# Patient Record
Sex: MALE | Race: WHITE | NOT HISPANIC OR LATINO | URBAN - NONMETROPOLITAN AREA
[De-identification: names, ages, dates, MRNs, and addresses within clinical notes are randomized per-mention and may not be internally consistent; named-entity substitution may affect disease eponyms.]

---

## 2018-12-03 PROBLEM — H26.491: Noted: 2018-12-03

## 2018-12-03 PROBLEM — Z96.1: Noted: 2018-12-03

## 2018-12-03 PROBLEM — H35.3132: Noted: 2018-12-03

## 2018-12-03 PROBLEM — H52.4: Noted: 2018-12-03

## 2018-12-03 PROBLEM — H43.813: Noted: 2018-12-03

## 2019-12-09 ENCOUNTER — IMPORTED ENCOUNTER (OUTPATIENT)
Dept: URBAN - NONMETROPOLITAN AREA CLINIC 1 | Facility: CLINIC | Age: 79
End: 2019-12-09

## 2019-12-09 PROCEDURE — 92014 COMPRE OPH EXAM EST PT 1/>: CPT

## 2019-12-09 PROCEDURE — 92015 DETERMINE REFRACTIVE STATE: CPT

## 2019-12-09 NOTE — PATIENT DISCUSSION
Presbyopia OUDiscussed refractive status in detail with patient. New glasses Rx given today. Continue to monitor. Pseudophakia OU mild PCO OD S/P YAG OSDiscussed diagnosis in detail with patient. Both intraocular lenses in place and stable. Mild PCO noted OD but not visually significant. Continue to monitor. 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 Discussed the chronic nature of this disease and limited treatment options. Recommended no smoking. OCT done today; stable with no subretinal fluid OU. Continue to monitor. RTC in 1 year with OCT PVD OUDiscussed findings of exam in detail with the patient. The risk of retinal detachment in patients with PVDs was discussed with the patient and the warning signs of retinal detachment were carefully reviewed with the patient. The patient was warned to return to the office or contact the ophthalmologist on call immediately if they experience signs of retinal detachment. Continue to monitor.; 's Notes: MR 12/9/19DFE 12/9/19[facility]  11/4/13OCT  12/9/19

## 2020-12-11 ENCOUNTER — IMPORTED ENCOUNTER (OUTPATIENT)
Dept: URBAN - NONMETROPOLITAN AREA CLINIC 1 | Facility: CLINIC | Age: 80
End: 2020-12-11

## 2020-12-11 PROCEDURE — 92014 COMPRE OPH EXAM EST PT 1/>: CPT

## 2020-12-11 PROCEDURE — 92015 DETERMINE REFRACTIVE STATE: CPT

## 2020-12-11 NOTE — PATIENT DISCUSSION
Presbyopia OUDiscussed refractive status in detail with patient. New glasses Rx given today. Continue to monitor. Pseudophakia OU mild PCO OD S/P YAG OSDiscussed diagnosis in detail with patient. Both intraocular lenses in place and stable. Mild PCO noted OD but not visually significant. Continue to monitor. 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 Discussed the chronic nature of this disease and limited treatment options. Recommended no smoking. Continue to monitor. RTC in 6 months with OCTPVD OUDiscussed findings of exam in detail with the patient. The risk of retinal detachment in patients with PVDs was discussed with the patient and the warning signs of retinal detachment were carefully reviewed with the patient. The patient was warned to return to the office or contact the ophthalmologist on call immediately if they experience signs of retinal detachment. Continue to monitor.; 's Notes: MR 12/11/20DFE 12/11/20[facility]  11/4/13OCT  12/9/19

## 2021-06-14 ENCOUNTER — IMPORTED ENCOUNTER (OUTPATIENT)
Dept: URBAN - NONMETROPOLITAN AREA CLINIC 1 | Facility: CLINIC | Age: 81
End: 2021-06-14

## 2021-06-14 ENCOUNTER — PREPPED CHART (OUTPATIENT)
Dept: URBAN - NONMETROPOLITAN AREA CLINIC 1 | Facility: CLINIC | Age: 81
End: 2021-06-14

## 2021-06-14 PROBLEM — H35.3132: Noted: 2021-06-14

## 2021-06-14 PROBLEM — H43.813: Noted: 2018-12-03

## 2021-06-14 PROBLEM — H26.491: Noted: 2021-06-14

## 2021-06-14 PROBLEM — H10.423: Noted: 2021-06-14

## 2021-06-14 PROBLEM — Z96.1: Noted: 2021-06-14

## 2021-06-14 PROCEDURE — 99214 OFFICE O/P EST MOD 30 MIN: CPT

## 2021-06-14 PROCEDURE — 92134 CPTRZ OPH DX IMG PST SGM RTA: CPT

## 2021-06-14 NOTE — PATIENT DISCUSSION
Discussed diagosis in detail with patient. 1+ Papillae noted OU. Start Pataday QD OU. Continue to monitor.

## 2021-06-14 NOTE — PATIENT DISCUSSION
Discussed fidings of exam i detail with patient. Discussed the chronic ature of this disease and limited treatment options. Recommeded o smoking. OCT done today; stable from previous with no subretinal fluid OU. Cotinue to monitor.

## 2021-06-14 NOTE — PATIENT DISCUSSION
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 Discussed the chronic nature of this disease and limited treatment options. Recommended no smoking. OCT done today; stable from previous with no subretinal fluid OU. Continue to monitor. 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 1+papillae noted OU. Start Pataday QD OU. Continue to monitor. Pseudophakia OU mild PCO OD S/P YAG OSDiscussed diagnosis in detail with patient. Both intraocular lenses in place and stable. Mild PCO noted OD but not visually significant. Continue to monitor. PVD OUDiscussed findings of exam in detail with the patient. The risk of retinal detachment in patients with PVDs was discussed with the patient and the warning signs of retinal detachment were carefully reviewed with the patient. The patient was warned to return to the office or contact the ophthalmologist on call immediately if they experience signs of retinal detachment. Continue to monitor.; 's Notes: MR 12/11/20DFE  6/14/21[facility]  11/4/13OCT  6/14/21

## 2021-06-14 NOTE — PATIENT DISCUSSION
mild PCO OS noted, s/p yag PC OS. Discussed diagnosis in detail with patient. Both intraocular roger in place ad stable. Mild PCO noted OD but not visually significant. Continue to monitor.

## 2021-06-14 NOTE — PATIENT DISCUSSION
Pseudophakia OU, mild PCO OS noted, s/p yag PC OS. Discussed diagnosis in detail with patient. Both intraocular roger in place ad stable. Mild PCO noted OD but not visually significant. Continue to monitor.

## 2022-01-23 ASSESSMENT — TONOMETRY
OS_IOP_MMHG: 12
OD_IOP_MMHG: 12

## 2022-01-23 ASSESSMENT — VISUAL ACUITY
OD_CC: 20/30
OS_PH: 20/25
OS_CC: 20/40

## 2022-01-28 ENCOUNTER — FOLLOW UP (OUTPATIENT)
Dept: URBAN - NONMETROPOLITAN AREA CLINIC 1 | Facility: CLINIC | Age: 82
End: 2022-01-28

## 2022-01-28 DIAGNOSIS — H52.4: ICD-10-CM

## 2022-01-28 DIAGNOSIS — H52.203: ICD-10-CM

## 2022-01-28 PROCEDURE — 92014 COMPRE OPH EXAM EST PT 1/>: CPT

## 2022-01-28 PROCEDURE — 92015 DETERMINE REFRACTIVE STATE: CPT

## 2022-01-28 ASSESSMENT — VISUAL ACUITY
OD_CC: 20/20-1
OS_CC: 20/25-2

## 2022-01-28 ASSESSMENT — TONOMETRY
OS_IOP_MMHG: 13
OD_IOP_MMHG: 13

## 2022-01-28 NOTE — PATIENT DISCUSSION
Discussed findings of exam i detail with patient. Discussed the chronic nature of this disease and limited treatment options.  Continue eye vitamins and monitoring vision with amsler grid. Continue to monitor.

## 2022-01-28 NOTE — PATIENT DISCUSSION
Discussed diagnosis in detail with patient. Both intraocular roger in place ad stable. Continue to monitor.

## 2022-04-15 ASSESSMENT — TONOMETRY
OD_IOP_MMHG: 13
OD_IOP_MMHG: 12
OS_IOP_MMHG: 12
OS_IOP_MMHG: 12
OD_IOP_MMHG: 12
OS_IOP_MMHG: 12

## 2022-04-15 ASSESSMENT — VISUAL ACUITY
OD_SC: 20/40-
OS_SC: 20/25
OS_SC: 20/20-2
OS_SC: 20/40
OS_PH: 20/25-2
OD_SC: 20/30+
OD_SC: 20/30-2

## 2022-07-29 ENCOUNTER — FOLLOW UP (OUTPATIENT)
Dept: URBAN - NONMETROPOLITAN AREA CLINIC 1 | Facility: CLINIC | Age: 82
End: 2022-07-29

## 2022-07-29 DIAGNOSIS — H35.3132: ICD-10-CM

## 2022-07-29 PROCEDURE — 92134 CPTRZ OPH DX IMG PST SGM RTA: CPT

## 2022-07-29 PROCEDURE — 99214 OFFICE O/P EST MOD 30 MIN: CPT

## 2022-07-29 ASSESSMENT — TONOMETRY
OS_IOP_MMHG: 12
OD_IOP_MMHG: 12

## 2022-07-29 ASSESSMENT — VISUAL ACUITY
OS_CC: 20/29+
OD_CC: 20/20

## 2023-01-30 ENCOUNTER — COMPREHENSIVE EXAM (OUTPATIENT)
Dept: URBAN - NONMETROPOLITAN AREA CLINIC 1 | Facility: CLINIC | Age: 83
End: 2023-01-30

## 2023-01-30 DIAGNOSIS — H52.4: ICD-10-CM

## 2023-01-30 PROCEDURE — 92015 DETERMINE REFRACTIVE STATE: CPT

## 2023-01-30 PROCEDURE — 92014 COMPRE OPH EXAM EST PT 1/>: CPT

## 2023-01-30 ASSESSMENT — VISUAL ACUITY
OD_CC: 20/25+1
OS_CC: 20/30

## 2023-01-30 ASSESSMENT — TONOMETRY
OD_IOP_MMHG: 14
OS_IOP_MMHG: 14

## 2023-07-31 ENCOUNTER — FOLLOW UP (OUTPATIENT)
Dept: URBAN - NONMETROPOLITAN AREA CLINIC 1 | Facility: CLINIC | Age: 83
End: 2023-07-31

## 2023-07-31 DIAGNOSIS — H43.813: ICD-10-CM

## 2023-07-31 DIAGNOSIS — H26.491: ICD-10-CM

## 2023-07-31 DIAGNOSIS — H10.423: ICD-10-CM

## 2023-07-31 DIAGNOSIS — H35.3132: ICD-10-CM

## 2023-07-31 PROCEDURE — 92134 CPTRZ OPH DX IMG PST SGM RTA: CPT

## 2023-07-31 PROCEDURE — 99214 OFFICE O/P EST MOD 30 MIN: CPT

## 2023-07-31 ASSESSMENT — VISUAL ACUITY
OU_CC: 20/20-2
OS_CC: 20/25-2
OD_CC: 20/22-2

## 2023-07-31 ASSESSMENT — TONOMETRY
OS_IOP_MMHG: 14
OD_IOP_MMHG: 14

## 2024-02-08 ENCOUNTER — ESTABLISHED PATIENT (OUTPATIENT)
Dept: URBAN - NONMETROPOLITAN AREA CLINIC 1 | Facility: CLINIC | Age: 84
End: 2024-02-08

## 2024-02-08 DIAGNOSIS — H52.4: ICD-10-CM

## 2024-02-08 PROCEDURE — 92015 DETERMINE REFRACTIVE STATE: CPT

## 2024-02-08 PROCEDURE — 92014 COMPRE OPH EXAM EST PT 1/>: CPT

## 2024-02-08 ASSESSMENT — TONOMETRY
OD_IOP_MMHG: 15
OS_IOP_MMHG: 16

## 2024-02-08 ASSESSMENT — VISUAL ACUITY
OS_CC: 20/25
OD_CC: 20/20

## 2024-02-29 ENCOUNTER — EMERGENCY VISIT (OUTPATIENT)
Dept: URBAN - NONMETROPOLITAN AREA CLINIC 1 | Facility: CLINIC | Age: 84
End: 2024-02-29

## 2024-02-29 DIAGNOSIS — H50.00: ICD-10-CM

## 2024-02-29 DIAGNOSIS — H35.3132: ICD-10-CM

## 2024-02-29 PROCEDURE — 92134 CPTRZ OPH DX IMG PST SGM RTA: CPT

## 2024-02-29 PROCEDURE — 99214 OFFICE O/P EST MOD 30 MIN: CPT

## 2024-02-29 ASSESSMENT — TONOMETRY
OS_IOP_MMHG: 17
OD_IOP_MMHG: 17

## 2024-02-29 ASSESSMENT — VISUAL ACUITY
OS_PH: 20/25-2
OS_CC: 20/40-1
OD_CC: 20/30+2

## 2024-03-29 ENCOUNTER — FOLLOW UP (OUTPATIENT)
Dept: URBAN - NONMETROPOLITAN AREA CLINIC 1 | Facility: CLINIC | Age: 84
End: 2024-03-29

## 2024-03-29 DIAGNOSIS — H50.00: ICD-10-CM

## 2024-03-29 DIAGNOSIS — H35.3132: ICD-10-CM

## 2024-03-29 DIAGNOSIS — H43.813: ICD-10-CM

## 2024-03-29 DIAGNOSIS — H10.423: ICD-10-CM

## 2024-03-29 DIAGNOSIS — H26.491: ICD-10-CM

## 2024-03-29 DIAGNOSIS — Z96.1: ICD-10-CM

## 2024-03-29 PROCEDURE — 99213 OFFICE O/P EST LOW 20 MIN: CPT

## 2024-03-29 ASSESSMENT — VISUAL ACUITY
OD_CC: 20/25-1
OS_CC: 20/40+1
OS_PH: 20/25

## 2024-03-29 ASSESSMENT — TONOMETRY
OD_IOP_MMHG: 15
OS_IOP_MMHG: 15

## 2024-11-04 ENCOUNTER — FOLLOW UP (OUTPATIENT)
Dept: URBAN - NONMETROPOLITAN AREA CLINIC 1 | Facility: CLINIC | Age: 84
End: 2024-11-04

## 2024-11-04 DIAGNOSIS — H10.423: ICD-10-CM

## 2024-11-04 DIAGNOSIS — H35.3132: ICD-10-CM

## 2024-11-04 DIAGNOSIS — H43.813: ICD-10-CM

## 2024-11-04 DIAGNOSIS — H26.491: ICD-10-CM

## 2024-11-04 PROCEDURE — 99214 OFFICE O/P EST MOD 30 MIN: CPT

## 2024-11-04 PROCEDURE — 92134 CPTRZ OPH DX IMG PST SGM RTA: CPT

## 2025-07-25 ENCOUNTER — FOLLOW UP (OUTPATIENT)
Age: 85
End: 2025-07-25

## 2025-07-25 DIAGNOSIS — H43.813: ICD-10-CM

## 2025-07-25 DIAGNOSIS — H10.423: ICD-10-CM

## 2025-07-25 DIAGNOSIS — Z96.1: ICD-10-CM

## 2025-07-25 DIAGNOSIS — H35.3132: ICD-10-CM

## 2025-07-25 DIAGNOSIS — H50.00: ICD-10-CM

## 2025-07-25 DIAGNOSIS — H26.491: ICD-10-CM

## 2025-07-25 PROCEDURE — 92250 FUNDUS PHOTOGRAPHY W/I&R: CPT

## 2025-07-25 PROCEDURE — 99214 OFFICE O/P EST MOD 30 MIN: CPT
